# Patient Record
Sex: FEMALE | Race: WHITE | Employment: FULL TIME | URBAN - METROPOLITAN AREA
[De-identification: names, ages, dates, MRNs, and addresses within clinical notes are randomized per-mention and may not be internally consistent; named-entity substitution may affect disease eponyms.]

---

## 2024-03-05 ENCOUNTER — APPOINTMENT (OUTPATIENT)
Dept: CT IMAGING | Facility: HOSPITAL | Age: 49
End: 2024-03-05
Attending: EMERGENCY MEDICINE
Payer: OTHER GOVERNMENT

## 2024-03-05 ENCOUNTER — HOSPITAL ENCOUNTER (EMERGENCY)
Facility: HOSPITAL | Age: 49
Discharge: HOME | End: 2024-03-05
Attending: EMERGENCY MEDICINE
Payer: OTHER GOVERNMENT

## 2024-03-05 VITALS
SYSTOLIC BLOOD PRESSURE: 145 MMHG | BODY MASS INDEX: 37.19 KG/M2 | WEIGHT: 197 LBS | OXYGEN SATURATION: 98 % | HEIGHT: 61 IN | HEART RATE: 106 BPM | RESPIRATION RATE: 18 BRPM | DIASTOLIC BLOOD PRESSURE: 78 MMHG | TEMPERATURE: 98 F

## 2024-03-05 DIAGNOSIS — R00.2 PALPITATIONS: Primary | ICD-10-CM

## 2024-03-05 DIAGNOSIS — R07.89 CHEST TIGHTNESS: ICD-10-CM

## 2024-03-05 LAB
ALBUMIN SERPL-MCNC: 4.4 G/DL (ref 3.2–4.8)
ALP LIVER SERPL-CCNC: 60 U/L
ALT SERPL-CCNC: 33 U/L
ANION GAP SERPL CALC-SCNC: 5 MMOL/L (ref 0–18)
AST SERPL-CCNC: 31 U/L (ref ?–34)
ATRIAL RATE: 119 BPM
B-HCG UR QL: NEGATIVE
BASOPHILS # BLD AUTO: 0.03 X10(3) UL (ref 0–0.2)
BASOPHILS NFR BLD AUTO: 0.3 %
BILIRUB DIRECT SERPL-MCNC: <0.1 MG/DL (ref ?–0.3)
BILIRUB SERPL-MCNC: 0.3 MG/DL (ref 0.3–1.2)
BNP SERPL-MCNC: 9 PG/ML
BUN BLD-MCNC: 14 MG/DL (ref 9–23)
BUN/CREAT SERPL: 20 (ref 10–20)
CALCIUM BLD-MCNC: 9.2 MG/DL (ref 8.7–10.4)
CHLORIDE SERPL-SCNC: 110 MMOL/L (ref 98–112)
CO2 SERPL-SCNC: 23 MMOL/L (ref 21–32)
CREAT BLD-MCNC: 0.7 MG/DL
DEPRECATED RDW RBC AUTO: 39 FL (ref 35.1–46.3)
EGFRCR SERPLBLD CKD-EPI 2021: 108 ML/MIN/1.73M2 (ref 60–?)
EOSINOPHIL # BLD AUTO: 0.1 X10(3) UL (ref 0–0.7)
EOSINOPHIL NFR BLD AUTO: 1 %
ERYTHROCYTE [DISTWIDTH] IN BLOOD BY AUTOMATED COUNT: 13.1 % (ref 11–15)
GLUCOSE BLD-MCNC: 128 MG/DL (ref 70–99)
HCT VFR BLD AUTO: 41.3 %
HGB BLD-MCNC: 14.4 G/DL
IMM GRANULOCYTES # BLD AUTO: 0.1 X10(3) UL (ref 0–1)
IMM GRANULOCYTES NFR BLD: 1 %
LYMPHOCYTES # BLD AUTO: 1.53 X10(3) UL (ref 1–4)
LYMPHOCYTES NFR BLD AUTO: 15.3 %
MCH RBC QN AUTO: 29.9 PG (ref 26–34)
MCHC RBC AUTO-ENTMCNC: 34.9 G/DL (ref 31–37)
MCV RBC AUTO: 85.7 FL
MONOCYTES # BLD AUTO: 0.59 X10(3) UL (ref 0.1–1)
MONOCYTES NFR BLD AUTO: 5.9 %
NEUTROPHILS # BLD AUTO: 7.63 X10 (3) UL (ref 1.5–7.7)
NEUTROPHILS # BLD AUTO: 7.63 X10(3) UL (ref 1.5–7.7)
NEUTROPHILS NFR BLD AUTO: 76.5 %
OSMOLALITY SERPL CALC.SUM OF ELEC: 288 MOSM/KG (ref 275–295)
P AXIS: 59 DEGREES
P-R INTERVAL: 140 MS
PLATELET # BLD AUTO: 264 10(3)UL (ref 150–450)
POTASSIUM SERPL-SCNC: 4.3 MMOL/L (ref 3.5–5.1)
PROT SERPL-MCNC: 7.1 G/DL (ref 5.7–8.2)
Q-T INTERVAL: 326 MS
QRS DURATION: 74 MS
QTC CALCULATION (BEZET): 458 MS
R AXIS: 89 DEGREES
RBC # BLD AUTO: 4.82 X10(6)UL
SODIUM SERPL-SCNC: 138 MMOL/L (ref 136–145)
T AXIS: 62 DEGREES
TROPONIN I SERPL HS-MCNC: <3 NG/L
TROPONIN I SERPL HS-MCNC: <3 NG/L
TSI SER-ACNC: 1.73 MIU/ML (ref 0.55–4.78)
VENTRICULAR RATE: 119 BPM
WBC # BLD AUTO: 10 X10(3) UL (ref 4–11)

## 2024-03-05 PROCEDURE — 71260 CT THORAX DX C+: CPT | Performed by: EMERGENCY MEDICINE

## 2024-03-05 PROCEDURE — 96361 HYDRATE IV INFUSION ADD-ON: CPT

## 2024-03-05 PROCEDURE — 83880 ASSAY OF NATRIURETIC PEPTIDE: CPT | Performed by: EMERGENCY MEDICINE

## 2024-03-05 PROCEDURE — 93010 ELECTROCARDIOGRAM REPORT: CPT

## 2024-03-05 PROCEDURE — 80048 BASIC METABOLIC PNL TOTAL CA: CPT | Performed by: EMERGENCY MEDICINE

## 2024-03-05 PROCEDURE — 84484 ASSAY OF TROPONIN QUANT: CPT | Performed by: EMERGENCY MEDICINE

## 2024-03-05 PROCEDURE — 80076 HEPATIC FUNCTION PANEL: CPT | Performed by: EMERGENCY MEDICINE

## 2024-03-05 PROCEDURE — 81025 URINE PREGNANCY TEST: CPT

## 2024-03-05 PROCEDURE — 96360 HYDRATION IV INFUSION INIT: CPT

## 2024-03-05 PROCEDURE — 99285 EMERGENCY DEPT VISIT HI MDM: CPT

## 2024-03-05 PROCEDURE — 84443 ASSAY THYROID STIM HORMONE: CPT | Performed by: EMERGENCY MEDICINE

## 2024-03-05 PROCEDURE — 93005 ELECTROCARDIOGRAM TRACING: CPT

## 2024-03-05 PROCEDURE — 85025 COMPLETE CBC W/AUTO DIFF WBC: CPT | Performed by: EMERGENCY MEDICINE

## 2024-03-05 PROCEDURE — 99284 EMERGENCY DEPT VISIT MOD MDM: CPT

## 2024-03-05 NOTE — ED INITIAL ASSESSMENT (HPI)
Pt presents to ED with difficulty breathing since 1000 this morning, states she began feeling near syncopal while at a conference about 20 minutes PTA. Pt denies CP. Reports recent shingles to left side of chest.

## 2024-03-05 NOTE — ED PROVIDER NOTES
Patient Seen in: [facility] Emergency Department      History     Chief Complaint   Patient presents with    Difficulty Breathing     Stated Complaint:     Subjective:   48-year-old female who has no medical history who drove from [place] 4 hours yesterday for a conference stage while sitting in the conference at 10 AM this morning she felt some chest tightness.  It eventually went away and she ate lunch and she felt better but then at about 12:50 PM she started feeling her heart racing and she was short of breath with this and a little anxious.  No cough or fever.  No leg pain or swelling.  No history of blood clot.  She did have breast cancer treated with mastectomy and tamoxifen over a decade ago.  No tobacco or alcohol abuse.  No URI symptoms.  No excessive caffeine today.  She was not under a lot of stress when this happened.  About 12 days ago she developed a zoster rash on her upper chest and back and finished antivirals about 2 days ago.  No significant pain there.  Breathing feels better now            Objective:   History reviewed. No pertinent past medical history.           History reviewed. No pertinent surgical history.             Social History     Socioeconomic History    Marital status:    Vaping Use    Vaping Use: Never used   Substance and Sexual Activity    Alcohol use: Not Currently    Drug use: Not Currently              Review of Systems    Positive for stated complaint:   Other systems are as noted in HPI.  Constitutional and vital signs reviewed.      All other systems reviewed and negative except as noted above.    Physical Exam     ED Triage Vitals [03/05/24 1347]   BP (!) 164/100   Pulse (!) 134   Resp 16   Temp 97.7 °F (36.5 °C)   Temp src    SpO2 99 %   O2 Device None (Room air)       Current:/78   Pulse 106   Temp 97.7 °F (36.5 °C)   Resp 18   Ht 154.9 cm (5' 1\")   Wt 89.4 kg   LMP 03/05/2024   SpO2 98%   BMI 37.22 kg/m²         Physical Exam  HENT:       Head: Normocephalic.      Mouth/Throat:      Mouth: Mucous membranes are moist.   Eyes:      Pupils: Pupils are equal, round, and reactive to light.   Cardiovascular:      Rate and Rhythm: Regular rhythm. Tachycardia present.      Pulses: Normal pulses.      Heart sounds: Normal heart sounds.   Pulmonary:      Effort: Pulmonary effort is normal.      Breath sounds: Normal breath sounds.   Abdominal:      Palpations: Abdomen is soft.      Tenderness: There is no abdominal tenderness.   Genitourinary:     General: Normal vulva.   Musculoskeletal:         General: No swelling or tenderness.      Cervical back: Normal range of motion.   Neurological:      General: No focal deficit present.      Mental Status: She is alert.      Sensory: No sensory deficit.      Motor: No weakness.               ED Course     Labs Reviewed   BASIC METABOLIC PANEL (8) - Abnormal; Notable for the following components:       Result Value    Glucose 128 (*)     All other components within normal limits   HEPATIC FUNCTION PANEL (7) - Normal   TROPONIN I HIGH SENSITIVITY - Normal   TROPONIN I HIGH SENSITIVITY - Normal   TSH W REFLEX TO FREE T4 - Normal   BNP (B TYPE NATRIURETIC PEPTIDE) - Normal   POCT PREGNANCY URINE - Normal   CBC WITH DIFFERENTIAL WITH PLATELET    Narrative:     The following orders were created for panel order CBC With Differential With Platelet.  Procedure                               Abnormality         Status                     ---------                               -----------         ------                     CBC W/ DIFFERENTIAL[[id number]]                              Final result                 Please view results for these tests on the individual orders.   CBC W/ DIFFERENTIAL     EKG    Rate, intervals and axes as noted on EKG Report.  Rate: 119  Rhythm: Sinus Rhythm  Reading: Sinus tachycardia.  Normal axis and intervals.  Normal ST segments.  Abnormal because of rate read by myself.              ED Course as  of 03/06/24 1355  ------------------------------------------------------------  Time: 03/05 1657  Comment: Labs independently interpreted by me.  CBC normal, CMP normal, TSH normal, troponin normal.  Repeat EKG shows sinus tachycardia at 104 with normal axis and intervals normal ST segments.  ------------------------------------------------------------  Time: 03/05 1943  Comment: Discussed with Shanthi.  He reviewed the EKGs.  Patient's heart rate was 99 and she is feeling better.  Does not seem to be ACS.  PE has been excluded.  She is comfortable being discharged and is good to go home in the morning.  She will return here for changes or worsening.              MDM      CT CHEST PE AORTA (IV ONLY) (CPT=71260)    Result Date: 3/5/2024  CONCLUSION:   No pulmonary embolus in the central, main, lobar, segmental pulmonary arteries. Nondiagnostic in the subsegmental pulmonary arteries due to respiratory motion artifact.  Mosaic attenuation of the lungs bilaterally suggestive of air trapping which can be seen with small vessel or small airways disease.  Bronchial wall thickening suggestive of mild chronic airway inflammation.   Hepatic steatosis  Partially ruptured right breast implant.  Correlation with prior imaging recommended to demonstrate stability.    Dictated by (CST): Reji Pradhan MD on 3/05/2024 at 5:50 PM     Finalized by (CST): Reji Pradhan MD on 3/05/2024 at 5:53 PM                                            Medical Decision Making  Patient with chest tightness, palpitations with recent zoster infection and recent travel last night.  Differential is vast but could include arrhythmia, ACS, PE, pneumothorax, pneumonia, pneumothorax, metabolic or endocrinologic abnormalities, dehydration, anemia.  Because of risk factors I feel patient should have a CT along with labs.  She has normal sinus rhythm with a tachycardic rhythm of 109 on the monitor.  Slightly hypertensive in triage.  Pulse ox normal 99% on room  air.    Amount and/or Complexity of Data Reviewed  Labs: ordered. Decision-making details documented in ED Course.  Radiology: ordered and independent interpretation performed. Decision-making details documented in ED Course.  ECG/medicine tests: ordered and independent interpretation performed. Decision-making details documented in ED Course.  Discussion of management or test interpretation with external provider(s): See ed course        Disposition and Plan     Clinical Impression:  1. Palpitations    2. Chest tightness         Disposition:  Discharge  3/5/2024  7:45 pm    Follow-up:  Hugo Maki MD  [address] [place] [id number]  [phone]    Follow up            Medications Prescribed:  There are no discharge medications for this patient.

## 2024-03-06 NOTE — DISCHARGE INSTRUCTIONS
Return for changes worsening.  Read all instructions for further recommendations.  Follow-up with the recommended cardiologist or your own as soon as possible.  Drink plenty of fluids.

## 2024-03-07 LAB
ATRIAL RATE: 104 BPM
P AXIS: 31 DEGREES
P-R INTERVAL: 136 MS
Q-T INTERVAL: 348 MS
QRS DURATION: 70 MS
QTC CALCULATION (BEZET): 457 MS
R AXIS: 78 DEGREES
T AXIS: 36 DEGREES
VENTRICULAR RATE: 104 BPM